# Patient Record
Sex: FEMALE | Race: WHITE | NOT HISPANIC OR LATINO | Employment: OTHER | URBAN - METROPOLITAN AREA
[De-identification: names, ages, dates, MRNs, and addresses within clinical notes are randomized per-mention and may not be internally consistent; named-entity substitution may affect disease eponyms.]

---

## 2020-09-23 ENCOUNTER — TELEMEDICINE (OUTPATIENT)
Dept: FAMILY MEDICINE CLINIC | Facility: TELEHEALTH | Age: 25
End: 2020-09-23

## 2020-09-23 VITALS — BODY MASS INDEX: 18.83 KG/M2 | WEIGHT: 120 LBS | HEIGHT: 67 IN

## 2020-09-23 DIAGNOSIS — S05.01XA ABRASION OF RIGHT CORNEA, INITIAL ENCOUNTER: Primary | ICD-10-CM

## 2020-09-23 PROCEDURE — 99213 OFFICE O/P EST LOW 20 MIN: CPT | Performed by: NURSE PRACTITIONER

## 2020-09-23 RX ORDER — POLYMYXIN B SULFATE AND TRIMETHOPRIM 1; 10000 MG/ML; [USP'U]/ML
SOLUTION OPHTHALMIC
Qty: 1 EACH | Refills: 0 | Status: SHIPPED | OUTPATIENT
Start: 2020-09-23

## 2020-09-24 NOTE — PROGRESS NOTES
"CHIEF COMPLAINT  Eye problem    HPI  Ila Montoya is a 24 y.o. female  presents with complaint of right eye pain that started a little over an hour ago. It started hurting and burning suddenly. She felt like something was in it so she irrigated it several times. It hurts less when she keeps the eyeball still and does not move the eyelid. When she moves it it feels irritated. She does not wear contacts. She has a history of lasik surgery    Review of Systems   Constitutional: Negative for fever.   HENT: Positive for congestion (allergies) and sinus pressure (mild).    Eyes: Positive for pain (right), discharge (watery), redness (right) and visual disturbance (could be from \"messing with it\"). Negative for itching.   Respiratory: Negative for cough, shortness of breath and wheezing.    Cardiovascular: Negative for chest pain and palpitations.   Gastrointestinal: Negative for diarrhea, nausea and vomiting.   Musculoskeletal: Negative for myalgias.   Skin: Negative for pallor.       Past Medical History:   Diagnosis Date   • Eye exam, routine 2012   • Gastritis    • Nausea    • Weight loss, abnormal        Family History   Family history unknown: Yes       Social History     Socioeconomic History   • Marital status: Single     Spouse name: Not on file   • Number of children: Not on file   • Years of education: Not on file   • Highest education level: Not on file   Tobacco Use   • Smoking status: Never Smoker   • Smokeless tobacco: Never Used   Substance and Sexual Activity   • Alcohol use: Yes     Comment: occ   • Drug use: No         Ht 170.2 cm (67\")   Wt 54.4 kg (120 lb)   BMI 18.79 kg/m²     PHYSICAL EXAM  Physical Exam   Constitutional: She appears well-developed and well-nourished.   HENT:   Head: Normocephalic and atraumatic.   Right Ear: Hearing and external ear normal.   Left Ear: Hearing and external ear normal.   Nose: Rhinorrhea present.   Mouth/Throat: Mouth/Lips are normal.  Eyes: Right eye exhibits " discharge (watery). Right conjunctiva is injected.   Minimal erythema right eyelid   Pulmonary/Chest: No accessory muscle usage. No tachypnea and no bradypnea.  No respiratory distress.No use of oxygen by nasal cannulaNo use of oxygen by mask noted.  Neurological: She is alert.   Vitals reviewed.      Results for orders placed or performed during the hospital encounter of 10/14/16   Chlamydia Trachomatis, Neisseria Gonorrhoeae, Trichomonas Vaginalis, NIKHIL    Specimen: Vagina; Swab   Result Value Ref Range    Chlamydia trachomatis, NIKHIL Negative Negative    Gonococcus by NIKHIL Negative Negative    Trichomonas vaginosis Negative Negative   Specimen Status Report    Specimen: Vagina; Swab   Result Value Ref Range    Specimen Status Comment        Diagnoses and all orders for this visit:    Abrasion of right cornea, initial encounter    Other orders  -     trimethoprim-polymyxin b (Polytrim) 26424-9.1 UNIT/ML-% ophthalmic solution; 1 drop in affected eye every 4 hours while awake for conjunctivitis        May take tylenol or ibuprofen for pain.    FOLLOW-UP  As discussed during visit  if no improvement in symptoms follow up with ophthalmologist or  Urgent Care/Emergency Department  In am or f/u with ER if worsening of symptoms tonight    [facility]   [address] [address]  [phone]    if at any time you experiences fever AND/OR cough AND/OR shortness of breath AND/OR loss of sense of taste or smell, has been advised to go to nearest urgent care or emergency department for evaluation AND/OR testing      Patient verbalizes understanding of medication dosage, comfort measures, instructions for treatment and follow-up.    ISHAAN Aggarwal  09/23/2020  22:58 EDT    This visit was performed via Telehealth.  This patient has been instructed to follow-up with their primary care provider if their symptoms worsen or the treatment provided does not resolve their illness.

## 2020-09-24 NOTE — PATIENT INSTRUCTIONS
"The [facility] Eye Manual, 7th edition (pp. 14-16). [place]: Eliane Kluwer.\">   Corneal Abrasion    A corneal abrasion is a scratch or injury to the clear covering over the front of the eye (cornea). Your cornea forms a clear dome that protects your eye and helps to focus your vision. Your cornea is made up of many layers, but the surface layer is one of the most sensitive tissues in your body. A corneal abrasion can be very painful.  If a corneal abrasion is not treated, it can become infected and cause an ulcer. This can lead to scarring. A scarred cornea can affect your vision. Sometimes abrasions come back in the same area, even after the original injury has healed.  What are the causes?  This condition may be caused by:  · A poke in the eye.  · A gritty or irritating substance (foreign body) in the eye.  · Excessive eye rubbing.  · Very dry eyes.  · Certain eye infections.  · Contact lenses that fit poorly or are worn for a long period of time. You can also injure your cornea when putting contact lenses in your eye or taking them out.  · Eye surgery.  · Certain cornea problems may increase the chance of a corneal abrasion.  Sometimes, the cause is not known.  What are the signs or symptoms?  Symptoms of this condition include:  · Eye pain. The pain may get worse when you open and close your eye or when you move your eye.  · A feeling of something stuck in your eye.  · Tearing, redness, and sensitivity to light.  · Having trouble keeping your eye open, or not being able to keep it open.  · Blurred vision.  · Headache.  How is this diagnosed?  You may work with a health care provider who specializes in diseases and conditions of the eye (ophthalmologist). This condition may be diagnosed based on your medical history, symptoms, and an eye exam.  Before the eye exam, numbing drops may be put into your eye. You may also have dye put in your eye with a dropper or a small paper strip. The dye makes the abrasion easy " to see when your ophthalmologist examines your eye with a light. Your ophthalmologist may look at your eye through an eye scope (slit lamp).  How is this treated?  Treatment may vary depending on the cause of your condition, and it may include:  · Washing out your eye.  · Removing any foreign bodies that are in your eye.  · Using antibiotic drops or ointment to treat or prevent an infection.  · Using a dilating drop to decrease inflammation and pain.  · Using steroid drops or ointment to treat redness, irritation, or inflammation.  · Applying a cold, wet cloth (cold compress) or ice pack to ease the pain.  · Taking pain medicine by mouth (orally).  In some cases, an eye patch or bandage soft contact lens might also be used. An eye patch should not be used if the corneal abrasion was related to contact lens wear as it can increase the chance of infection in these eyes.  Follow these instructions at home:  Medicines  · Use eye drops or ointments as told by your health care provider.  · If you were prescribed antibiotic drops or ointment, use them as told by your health care provider. Do not stop using the antibiotic even if you start to feel better.  · Take over-the-counter and prescription medicines only as told by your health care provider.  · Ask your health care provider if the medicine prescribed to you:  ? Requires you to avoid driving or using heavy machinery.  ? Can cause constipation. You may need to take these actions to prevent or treat constipation:  § Drink enough fluid to keep your urine pale yellow.  § Take over-the-counter or prescription medicines.  § Eat foods that are high in fiber, such as beans, whole grains, and fresh fruits and vegetables.  § Limit foods that are high in fat and processed sugars, such as fried or sweet foods.  Eye patch use  · If you have an eye patch, wear it as told by your health care provider.  ? Do not drive or use machinery while wearing an eye patch. Your ability to   distances will be impaired.  ? Follow instructions from your health care provider about when to remove the patch.  General instructions  · Ask your health care provider whether you can use a cold compress on your eye to relieve pain.  · Do not rub or touch your eye. Do not wash out your eye.  · Do not wear contact lenses until your health care provider says that this is okay.  · Avoid bright light and eye strain.  · Keep all follow-up visits as told by your health care provider. This is important for preventing infection and vision loss.  Contact a health care provider if:  · You continue to have eye pain and other symptoms for more than 2 days.  · You have new symptoms, such as worse redness, tearing, or discharge.  · You have discharge that makes your eyelids stick together in the morning.  · Your eye patch becomes so loose that you can blink your eye.  · Symptoms return after the original abrasion has healed.  Get help right away if:  · You have severe eye pain that does not get better with medicine.  · You have vision loss.  Summary  · A corneal abrasion is a scratch or injury to the clear covering over the front of the eye (cornea).  · It is important to get treatment for a corneal abrasion. If this problem is not treated, it can affect your vision.  · Use eye drops or ointments as told by your health care provider.  · If you have an eye patch, do not drive or use machinery while wearing it. Your ability to  distances will be impaired.  · Let your health care provider know if your symptoms continue for more than 2 days.  This information is not intended to replace advice given to you by your health care provider. Make sure you discuss any questions you have with your health care provider.  Document Released: 12/15/2001 Document Revised: 04/24/2020 Document Reviewed: 04/24/2020  Elsevier Patient Education © 2020 [employer]

## 2021-04-16 ENCOUNTER — BULK ORDERING (OUTPATIENT)
Dept: CASE MANAGEMENT | Facility: OTHER | Age: 26
End: 2021-04-16

## 2021-04-16 DIAGNOSIS — Z23 IMMUNIZATION DUE: ICD-10-CM

## 2023-08-22 ENCOUNTER — HOSPITAL ENCOUNTER (EMERGENCY)
Facility: HOSPITAL | Age: 28
Discharge: HOME | End: 2023-08-22
Attending: EMERGENCY MEDICINE
Payer: COMMERCIAL

## 2023-08-22 VITALS
WEIGHT: 120 LBS | RESPIRATION RATE: 16 BRPM | OXYGEN SATURATION: 100 % | HEIGHT: 67 IN | BODY MASS INDEX: 18.83 KG/M2 | TEMPERATURE: 97.5 F | SYSTOLIC BLOOD PRESSURE: 126 MMHG | HEART RATE: 74 BPM | DIASTOLIC BLOOD PRESSURE: 66 MMHG

## 2023-08-22 DIAGNOSIS — N63.0 BREAST MASS IN FEMALE: Primary | ICD-10-CM

## 2023-08-22 LAB
ANION GAP SERPL CALCULATED.3IONS-SCNC: 8.6 MMOL/L (ref 5–15)
B-HCG UR QL: NEGATIVE
BASOPHILS # BLD AUTO: 0.05 10*3/MM3 (ref 0–0.2)
BASOPHILS NFR BLD AUTO: 0.8 % (ref 0–1.5)
BUN SERPL-MCNC: 13 MG/DL (ref 6–20)
BUN/CREAT SERPL: 23.6 (ref 7–25)
CALCIUM SPEC-SCNC: 9.2 MG/DL (ref 8.6–10.5)
CHLORIDE SERPL-SCNC: 105 MMOL/L (ref 98–107)
CO2 SERPL-SCNC: 23.4 MMOL/L (ref 22–29)
CREAT SERPL-MCNC: 0.55 MG/DL (ref 0.57–1)
DEPRECATED RDW RBC AUTO: 41.4 FL (ref 37–54)
EGFRCR SERPLBLD CKD-EPI 2021: 128.2 ML/MIN/1.73
EOSINOPHIL # BLD AUTO: 0.32 10*3/MM3 (ref 0–0.4)
EOSINOPHIL NFR BLD AUTO: 5.2 % (ref 0.3–6.2)
ERYTHROCYTE [DISTWIDTH] IN BLOOD BY AUTOMATED COUNT: 13.3 % (ref 12.3–15.4)
GLUCOSE SERPL-MCNC: 92 MG/DL (ref 65–99)
HCT VFR BLD AUTO: 39.4 % (ref 34–46.6)
HGB BLD-MCNC: 12.4 G/DL (ref 12–15.9)
IMM GRANULOCYTES # BLD AUTO: 0 10*3/MM3 (ref 0–0.05)
IMM GRANULOCYTES NFR BLD AUTO: 0 % (ref 0–0.5)
LYMPHOCYTES # BLD AUTO: 2.45 10*3/MM3 (ref 0.7–3.1)
LYMPHOCYTES NFR BLD AUTO: 39.9 % (ref 19.6–45.3)
MCH RBC QN AUTO: 26.3 PG (ref 26.6–33)
MCHC RBC AUTO-ENTMCNC: 31.5 G/DL (ref 31.5–35.7)
MCV RBC AUTO: 83.7 FL (ref 79–97)
MONOCYTES # BLD AUTO: 0.51 10*3/MM3 (ref 0.1–0.9)
MONOCYTES NFR BLD AUTO: 8.3 % (ref 5–12)
NEUTROPHILS NFR BLD AUTO: 2.81 10*3/MM3 (ref 1.7–7)
NEUTROPHILS NFR BLD AUTO: 45.8 % (ref 42.7–76)
PLATELET # BLD AUTO: 355 10*3/MM3 (ref 140–450)
PMV BLD AUTO: 10.4 FL (ref 6–12)
POTASSIUM SERPL-SCNC: 3.9 MMOL/L (ref 3.5–5.2)
RBC # BLD AUTO: 4.71 10*6/MM3 (ref 3.77–5.28)
SODIUM SERPL-SCNC: 137 MMOL/L (ref 136–145)
WBC NRBC COR # BLD: 6.14 10*3/MM3 (ref 3.4–10.8)

## 2023-08-22 PROCEDURE — 36415 COLL VENOUS BLD VENIPUNCTURE: CPT

## 2023-08-22 PROCEDURE — 99282 EMERGENCY DEPT VISIT SF MDM: CPT

## 2023-08-22 PROCEDURE — 85025 COMPLETE CBC W/AUTO DIFF WBC: CPT | Performed by: EMERGENCY MEDICINE

## 2023-08-22 PROCEDURE — 99283 EMERGENCY DEPT VISIT LOW MDM: CPT

## 2023-08-22 PROCEDURE — 80048 BASIC METABOLIC PNL TOTAL CA: CPT | Performed by: EMERGENCY MEDICINE

## 2023-08-22 PROCEDURE — 81025 URINE PREGNANCY TEST: CPT | Performed by: EMERGENCY MEDICINE

## 2023-08-22 RX ORDER — SULFAMETHOXAZOLE AND TRIMETHOPRIM 800; 160 MG/1; MG/1
1 TABLET ORAL 2 TIMES DAILY
Qty: 14 TABLET | Refills: 0 | Status: SHIPPED | OUTPATIENT
Start: 2023-08-22 | End: 2023-08-29

## 2023-08-22 RX ORDER — NAPROXEN 375 MG/1
375 TABLET ORAL 2 TIMES DAILY WITH MEALS
Qty: 20 TABLET | Refills: 0 | Status: SHIPPED | OUTPATIENT
Start: 2023-08-22 | End: 2023-09-01

## 2023-08-22 RX ORDER — CEPHALEXIN 500 MG/1
500 CAPSULE ORAL 4 TIMES DAILY
Qty: 28 CAPSULE | Refills: 0 | Status: SHIPPED | OUTPATIENT
Start: 2023-08-22 | End: 2023-08-29

## 2023-08-22 NOTE — DISCHARGE INSTRUCTIONS
Today right blood cell count is normal.  I did send a referral into the breast care center.  They should contact you within 2 to 3 days to set up appropriate follow-up.  If you do not hear from them by Friday morning I would suggest to go in and place a call as noted above.    I did send in a combination of Bactrim and Keflex for 1 week as this could be early mastitis.    Return to ER for increasing pain redness or other difficulties    Please read all of the instructions in this handout.  If you receive prescriptions please fill them and take them as directed.  Please call your primary care physician for follow-up appointment in the next 5 to 7 days.  If you do not have a physician you may call the [facility] referral line at [phone].    You may return to the emergency department at any time for any concerns such as worsening symptoms.  If you received a work or school note it will be printed at the back of this packet.

## 2023-08-22 NOTE — FSED PROVIDER NOTE
Subjective   History of Present Illness  Patient is a 27-year-old female who presents with a 2-week history of right breast discomfort.  She states that it progressed over the 2 weeks and over the last 1 to 2 days she started palpating a lump in the lower outer quadrant of the right breast.  No overlying erythema.  She is not breast-feeding.  Patient reports a lifelong history of occasional discharge from her right nipple.  No new discharge at this time.  No fever, she does admit to some chills at times.  No trauma    Review of Systems  Constitutional: No fevers, chills, sweats unless otherwise documented in HPI  Eyes: No recent visual problems, eye discharge, eye pain, redness unless otherwise documented in HPI  HEENT: No ear pain, nasal congestion, sore throat, voice changes unless otherwise documented in HPI  Respiratory: No shortness of breath, cough, pain on breathing, sputum production unless otherwise documented in HPI  Cardiovascular: No chest pain, palpitations, syncope, orthopnea unless otherwise documented in HPI  Gastrointestinal: No nausea, vomiting, diarrhea, constipation unless otherwise documented in HPI  Genitourinary: No hematuria, dysuria, incontinence unless otherwise documented in HPI  Endocrine: Negative for excessive thirst, excessive hunger, excessive urination, heat or cold intolerance unless otherwise documented in HPI  Musculoskeletal: No back pain, neck pain, joint pain, muscle pain, decreased range of motion unless otherwise documented in HPI  Integumentary: No rash, pruritus, abrasion, lesions unless otherwise documented in HPI  Neurologic: No weakness, numbness, frequent headaches, tremors unless otherwise documented in HPI  Psychiatric: No anxiety, depression, mood changes, hallucinations unless otherwise documented in HPI        Past Medical History:   Diagnosis Date    Eye exam, routine 2012    Gastritis     Nausea     Weight loss, abnormal        No Known Allergies    Past Surgical  History:   Procedure Laterality Date    EYE SURGERY  06/2015    lasix       Family History   Family history unknown: Yes       Social History     Socioeconomic History    Marital status: Single   Tobacco Use    Smoking status: Never    Smokeless tobacco: Never   Substance and Sexual Activity    Alcohol use: Yes     Comment: occ    Drug use: No           Objective   Physical Exam  Vital signs: Reviewed in nurses notes    General: Patient is awake alert no acute distress    HEENT: Normocephalic    Neck:   Supple without lymphadenopathy    Respiratory:   Clear to auscultation bilaterally.  Equal breath sounds bilaterally    Cardiovascular: Regular rate and rhythm    Breast exam: Performed with Missy RN, at bedside as chaperone: Right breast does reveal a palpable firm mass in the lower outer quadrant of the breast approximately 2 x 2 cm.  I do not palpate any fluctuance.  There is no overlying erythema.  It is slightly tender to palpation.  No nipple discharge noted.      Skin:   Warm and dry.  No rashes noted    Neurological examination: Patient is awake alert oriented x4.  Speech is normal.  No facial palsy.  No focal motor or sensory deficits.    Procedures           ED Course           I am going to cover the patient with Bactrim and Keflex to treat mastitis if this is the cause of the tenderness and breast mass.  I did talk with the breast care centers scheduling department and put a formal referral in for patient follow-up.    Proper return precautions were discussed                              Differential diagnosis includes mastitis, breast cyst, mass  Medical Decision Making  Problems Addressed:  Breast mass in female: complicated acute illness or injury    Amount and/or Complexity of Data Reviewed  Labs: ordered.    Risk  Prescription drug management.    Upon discharge patient noted to be very stable.  Appropriate treatment plan and return precautions discussed    Final diagnoses:   Breast mass in female        ED Disposition  ED Disposition       ED Disposition   Discharge    Condition   Stable    Comment   --               Francisco Rosario, ISHAAN  [address] [address]  [phone]          Francisco Rosario, ISHAAN  [address] [address]  [phone]               Medication List        New Prescriptions      cephalexin 500 MG capsule  Commonly known as: KEFLEX  Take 1 capsule by mouth 4 (Four) Times a Day for 7 days.     naproxen 375 MG tablet  Commonly known as: NAPROSYN  Take 1 tablet by mouth 2 (Two) Times a Day With Meals for 10 days.     sulfamethoxazole-trimethoprim 800-160 MG per tablet  Commonly known as: BACTRIM DS,SEPTRA DS  Take 1 tablet by mouth 2 (Two) Times a Day for 7 days.               Where to Get Your Medications        These medications were sent to [facility] [id number] - [place], [place] - [address] [place] [address] AVE AT [address] [place] [address] - [phone] [address] [phone] FX  [address] [place] [address], [address] [address]      Phone: [phone]   cephalexin 500 MG capsule  naproxen 375 MG tablet  sulfamethoxazole-trimethoprim 800-160 MG per tablet

## 2023-08-23 ENCOUNTER — TELEPHONE (OUTPATIENT)
Dept: SURGERY | Facility: CLINIC | Age: 28
End: 2023-08-23
Payer: COMMERCIAL

## 2023-08-23 NOTE — TELEPHONE ENCOUNTER
New patient chart prepared for Dr. Spring Sarah to review. New patient referral from Dr. Ivan Blackman for RT Breast Mass.

## 2023-08-25 NOTE — PROGRESS NOTES
BREAST CARE CENTER     Referring Provider: Ivan Blackman MD     Chief complaint: breast mass     HPI: Ms. Ila Montoya is a 28 yo woman, seen at the request of Ivan Blackman MD, for breast mass    I personally reviewed her records and summarized her relevant breast history/imaging:    She denies any family history of breast or ovarian cancer.    Today she presents with right breast lump that has been there for 1 year but within the last 2 weeks it has gotten bigger and painful. She is currently on Keflex 500mg for the area becoming slightly pink and warm to touch.  That has improved since being on the antibiotic. She also reports right nipple discharge for her entire life that is white to yellow but it has not happened in quiet some time.   She denies any prior history of abnormal mammograms or breast biopsies.       Review of Systems - Oncology    Medications:    Current Outpatient Medications:     cephalexin (KEFLEX) 500 MG capsule, Take 1 capsule by mouth 4 (Four) Times a Day for 7 days., Disp: 28 capsule, Rfl: 0    naproxen (NAPROSYN) 375 MG tablet, Take 1 tablet by mouth 2 (Two) Times a Day With Meals for 10 days., Disp: 20 tablet, Rfl: 0    sulfamethoxazole-trimethoprim (BACTRIM DS,SEPTRA DS) 800-160 MG per tablet, Take 1 tablet by mouth 2 (Two) Times a Day for 7 days., Disp: 14 tablet, Rfl: 0    trimethoprim-polymyxin b (Polytrim) 02927-7.1 UNIT/ML-% ophthalmic solution, 1 drop in affected eye every 4 hours while awake for conjunctivitis, Disp: 1 each, Rfl: 0    Allergies:  No Known Allergies    Medical history:  Past Medical History:   Diagnosis Date    Eye exam, routine 2012    Gastritis     Nausea     Weight loss, abnormal        Surgical History:  Past Surgical History:   Procedure Laterality Date    EYE SURGERY  06/2015    lasix       Family History:  Family History   Problem Relation Age of Onset    Pancreatic cancer Paternal Uncle     Breast cancer Other        Social History:   Social  History     Socioeconomic History    Marital status: Single   Tobacco Use    Smoking status: Never    Smokeless tobacco: Never   Substance and Sexual Activity    Alcohol use: Yes     Comment: occ    Drug use: No     Patient drinks 1 servings of caffeine per day.       GYNECOLOGIC HISTORY:   . P: 0. AB: 0.  Last menstrual period: 22  Age at menarche: 10  Age at first childbirth: n/a  Lactation/How long: n/a  Age at menopause: n/a  Total years of oral contraceptive use: n/a  Total years of hormone replacement therapy: n/a      Physical Exam  Vitals:    23 0856   BP: 102/60     ECOG 0 - Asymptomatic  General: NAD, well appearing  Psych: a&o x 3, normal mood and affect  Eyes: EOMI, no scleral icterus  ENMT: neck supple without masses or thyromegaly, mucus membranes moist  Resp: normal effort, CTAB  CV: RRR, no murmurs, no edema   GI: soft, NT, ND  MSK: normal gait, normal ROM in bilateral shoulders  Lymph nodes: no cervical, supraclavicular or axillary lymphadenopathy  Breast: asymmetric, R>L  Right: No visible abnormalities on inspection while seated, with arms raised or hands on hips. No masses, skin changes, or nipple abnormalities. 4cm mass at 9:00. Unable to express discharge  Left: No visible abnormalities on inspection while seated, with arms raised or hands on hips. No masses, skin changes, or nipple abnormalities. Unable to express discharge      Assessment:    Right breast mass  Breast pain  3. Right nipple discharge    Discussion:  We discussed the diagnosis of fibroadenoma.  I reviewed it's benign nature and usually benign natural history, often regressing with time.  We discussed indications for excision including symptoms (such as pain or cosmesis), large at baseline (>3cm), or interval increase in size on imaging or exam.  She currently has no indications for excision or imaging surveillance.  I explained that we will continue to monitor these masses clinically.  We had a lengthy discussion  about breast pain and how it can sometimes be difficult to treat. We discussed that this is often related to hormonal changes. Caffeine and nicotine can also play a role in breast pain. We also discussed the importance of wearing a good supportive bra at all times including bedtime.  We discussed additional therapies such as vitamin E supplementation and Primrose oil, and that these may or may not be useful. We discussed using OTC tylenol or ibuprofen for pain control. Finally, we discussed the use of topical NSAID creams.    3. We discussed types of nipple discharge. We discussed that physiologic discharge is usually bilateral, nonspontaneous, multi-ductal, and milky, but can be yellow, green or brown. That this can sometimes be associated with elevated prolactin levels and that elevated prolactin levels can have multiple causes (pregnancy, pituitary, thyroid, medications, etc).    We discussed that pathologic nipple discharge usually is unilateral, spontaneous, persistent, comes from a single duct, is bloody, clear, or serosanguinous, or is associated with a palpable or radiological evident breast mass.       Plan:  1. Wear sports bras during the day when possible. Wear sports bra or tight camisole at night while sleeping.   2.  Michael dx mammo and rt limited breast us in the near future and will call with results.  3. Rto in 3 mons - appointment has not been set yet, will make after mammo results    ISHAAN Wiggins have spent 45 mins in face to face time with the patient and in chart review.    CC:  MD Franchesca Piña Leslie J, MD (Inactive)    EMR Dragon/transcription disclaimer:  Dictated using Dragon dictation

## 2023-08-28 ENCOUNTER — TELEPHONE (OUTPATIENT)
Dept: SURGERY | Facility: CLINIC | Age: 28
End: 2023-08-28
Payer: MEDICAID

## 2023-08-28 ENCOUNTER — OFFICE VISIT (OUTPATIENT)
Dept: SURGERY | Facility: CLINIC | Age: 28
End: 2023-08-28
Payer: MEDICAID

## 2023-08-28 VITALS
HEIGHT: 67 IN | WEIGHT: 120 LBS | SYSTOLIC BLOOD PRESSURE: 102 MMHG | DIASTOLIC BLOOD PRESSURE: 60 MMHG | BODY MASS INDEX: 18.83 KG/M2

## 2023-08-28 DIAGNOSIS — N63.11 MASS OF UPPER OUTER QUADRANT OF RIGHT BREAST: Primary | ICD-10-CM

## 2023-08-28 DIAGNOSIS — N64.4 BREAST PAIN: ICD-10-CM

## 2023-10-02 ENCOUNTER — HOSPITAL ENCOUNTER (OUTPATIENT)
Dept: MAMMOGRAPHY | Facility: HOSPITAL | Age: 28
Discharge: HOME | End: 2023-10-02

## 2023-10-02 ENCOUNTER — HOSPITAL ENCOUNTER (OUTPATIENT)
Dept: ULTRASOUND IMAGING | Facility: HOSPITAL | Age: 28
Discharge: HOME | End: 2023-10-02

## 2023-10-02 DIAGNOSIS — N63.11 MASS OF UPPER OUTER QUADRANT OF RIGHT BREAST: ICD-10-CM

## 2023-10-02 PROCEDURE — 76642 ULTRASOUND BREAST LIMITED: CPT

## 2023-10-03 ENCOUNTER — TELEPHONE (OUTPATIENT)
Dept: SURGERY | Facility: CLINIC | Age: 28
End: 2023-10-03

## 2023-10-03 NOTE — TELEPHONE ENCOUNTER
----- Message from ISHAAN Wiggins sent at 10/3/2023  8:09 AM EDT -----  Please let py know that her u/s was normal and did not see a mass.  She needs to follow up with me in 3 months.  Please set up an appointment   thanks  ----- Message -----  From: Naina Rad Results [place] In  Sent: 10/2/2023   2:36 PM EDT  To: ISHAAN Wiggins

## 2023-10-03 NOTE — TELEPHONE ENCOUNTER
Called pt and let her know that her us was normal and there were no masses   Scheduled her for 3 mon fu   Pt stated understanding

## 2023-10-03 NOTE — PROGRESS NOTES
Told pt about results and scheduled her for her 3 month fu pt would like you to call her back she wants to know the reasoning for why her breast swelled up to twice its size

## 2024-01-15 ENCOUNTER — TELEPHONE (OUTPATIENT)
Dept: SURGERY | Facility: CLINIC | Age: 29
End: 2024-01-15

## 2024-01-22 ENCOUNTER — TELEPHONE (OUTPATIENT)
Dept: SURGERY | Facility: CLINIC | Age: 29
End: 2024-01-22

## 2024-01-29 ENCOUNTER — TELEPHONE (OUTPATIENT)
Dept: SURGERY | Facility: CLINIC | Age: 29
End: 2024-01-29

## 2024-02-05 ENCOUNTER — TELEPHONE (OUTPATIENT)
Dept: SURGERY | Facility: CLINIC | Age: 29
End: 2024-02-05

## 2024-02-05 NOTE — TELEPHONE ENCOUNTER
Lvm for pt to call back to [facility] fu with chandu White no show/cx letter via [employer]